# Patient Record
Sex: MALE | Race: WHITE | Employment: UNEMPLOYED | URBAN - METROPOLITAN AREA
[De-identification: names, ages, dates, MRNs, and addresses within clinical notes are randomized per-mention and may not be internally consistent; named-entity substitution may affect disease eponyms.]

---

## 2017-02-06 ENCOUNTER — DOCUMENTATION ONLY (OUTPATIENT)
Dept: NEUROLOGY | Age: 22
End: 2017-02-06

## 2017-02-06 ENCOUNTER — TELEPHONE (OUTPATIENT)
Dept: NEUROLOGY | Age: 22
End: 2017-02-06

## 2017-02-06 DIAGNOSIS — R56.9 CONVULSIONS, UNSPECIFIED CONVULSION TYPE (HCC): ICD-10-CM

## 2017-02-06 RX ORDER — LAMOTRIGINE 300 MG/1
300 TABLET, EXTENDED RELEASE ORAL
Qty: 30 TAB | Refills: 0 | Status: SHIPPED | OUTPATIENT
Start: 2017-02-06

## 2017-02-06 NOTE — TELEPHONE ENCOUNTER
Patients dad called asking for refill  Generic lamotrigine xr   Please review script and sign if approved

## 2017-02-06 NOTE — TELEPHONE ENCOUNTER
Called and spoke to dad of patient   Patient is asking for a refill   Made follow up appt 2/24/17 at [facility]  Sent request to provider awaiting approval

## 2017-02-06 NOTE — TELEPHONE ENCOUNTER
Patient is trying to get a prescription for lamictal to be sent to [facility] [address]   He was switched to Immediate release but hes having a reaction

## 2017-02-06 NOTE — TELEPHONE ENCOUNTER
Let father know patient is being discharged from Neurology clinic due frequent office visit cancellations (8-8-16, 8-17-16, 1-16-17, 1-17-17), no-shows (8-18-16, 8-23-16), medication non-compliance (0 lamictal in system when checked in October 2016, and not completing EEG request (given eeg order at last visit in July 2016).     Will send him a one-month Rx for Lamictal

## 2025-03-10 ENCOUNTER — TELEPHONE (OUTPATIENT)
Age: 30
End: 2025-03-10

## 2025-03-31 ENCOUNTER — OFFICE VISIT (OUTPATIENT)
Age: 30
End: 2025-03-31
Payer: MEDICAID

## 2025-03-31 VITALS
WEIGHT: 135 LBS | HEIGHT: 69 IN | DIASTOLIC BLOOD PRESSURE: 54 MMHG | RESPIRATION RATE: 16 BRPM | OXYGEN SATURATION: 96 % | SYSTOLIC BLOOD PRESSURE: 110 MMHG | BODY MASS INDEX: 19.99 KG/M2 | HEART RATE: 78 BPM

## 2025-03-31 DIAGNOSIS — R56.9 CONVULSIONS, UNSPECIFIED CONVULSION TYPE (HCC): Primary | ICD-10-CM

## 2025-03-31 PROCEDURE — 99213 OFFICE O/P EST LOW 20 MIN: CPT | Performed by: PSYCHIATRY & NEUROLOGY

## 2025-03-31 NOTE — PROGRESS NOTES
[facility] at [facility] at [address] [place] [address] [place] [address] [address] [facility] [address] [place] [address] [phone]              Chief Complaint   Patient presents with    Seizures     LOV 4/10/2023 // Pt states he is here for an updated DMV form - denies any sz activity - states he is here to have a DMV form completed - states he is on no prescription meds      No current outpatient medications on file.     No current facility-administered medications for this visit.      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  Today he reports he has continued without events.  He is here because DMV forms need to be completed.      Diagnostics  EEG ambulatory was unremarkable EEG 2014 24 ambulatory unremarkable  EEG from [facility] dated 11/11/2015 normal awake and asleep  MRI of the brain May 2014 unremarkable  Dry head CT April 28, 2014 unremarkable    Record review  Patient was previously seen by Dr. Llanes last in 2016  From his note of  20th 2016 he was following up for seizure disorder and it was a question of epileptic versus nonepileptic/stress related at that time he reported his last seizure was around Christmas or New Year's and he went to [facility] for that.    Examination  BP (!) 110/54 (BP Site: Left Upper Arm, Patient Position: Sitting)

## 2025-05-01 ENCOUNTER — APPOINTMENT (OUTPATIENT)
Facility: HOSPITAL | Age: 30
End: 2025-05-01
Payer: MEDICAID

## 2025-05-01 ENCOUNTER — HOSPITAL ENCOUNTER (EMERGENCY)
Facility: HOSPITAL | Age: 30
Discharge: HOME | End: 2025-05-01
Attending: EMERGENCY MEDICINE
Payer: MEDICAID

## 2025-05-01 VITALS
DIASTOLIC BLOOD PRESSURE: 94 MMHG | HEART RATE: 52 BPM | BODY MASS INDEX: 20.61 KG/M2 | TEMPERATURE: 97.4 F | RESPIRATION RATE: 16 BRPM | WEIGHT: 139.55 LBS | OXYGEN SATURATION: 99 % | SYSTOLIC BLOOD PRESSURE: 148 MMHG

## 2025-05-01 DIAGNOSIS — S63.651A SPRAIN OF METACARPOPHALANGEAL (MCP) JOINT OF LEFT INDEX FINGER, INITIAL ENCOUNTER: Primary | ICD-10-CM

## 2025-05-01 PROCEDURE — 73140 X-RAY EXAM OF FINGER(S): CPT

## 2025-05-01 PROCEDURE — 99283 EMERGENCY DEPT VISIT LOW MDM: CPT

## 2025-05-01 RX ORDER — IBUPROFEN 600 MG/1
600 TABLET, FILM COATED ORAL
Status: DISCONTINUED | OUTPATIENT
Start: 2025-05-01 | End: 2025-05-01 | Stop reason: HOSPADM

## 2025-05-01 RX ORDER — ACETAMINOPHEN 500 MG
1000 TABLET ORAL 3 TIMES DAILY
Qty: 120 TABLET | Refills: 3 | Status: SHIPPED | OUTPATIENT
Start: 2025-05-01

## 2025-05-01 RX ORDER — IBUPROFEN 600 MG/1
600 TABLET, FILM COATED ORAL EVERY 6 HOURS PRN
Qty: 28 TABLET | Refills: 0 | Status: SHIPPED | OUTPATIENT
Start: 2025-05-01 | End: 2025-05-08

## 2025-05-01 ASSESSMENT — PAIN - FUNCTIONAL ASSESSMENT
PAIN_FUNCTIONAL_ASSESSMENT: NONE - DENIES PAIN
PAIN_FUNCTIONAL_ASSESSMENT: 0-10

## 2025-05-01 ASSESSMENT — PAIN SCALES - GENERAL: PAINLEVEL_OUTOF10: 4

## 2025-05-01 NOTE — ED TRIAGE NOTES
Pt complaining of left hand pain.  States he has pain in his first finger in the knuckle region.  Swelling noted.  Pain x1 month

## 2025-05-01 NOTE — ED PROVIDER NOTES
[facility]  EMERGENCY DEPARTMENT ENCOUNTER      Pt Name: Christiano Meyer  MRN: [id number]  Birthdate 1995  Date of evaluation: 5/1/2025  Provider: Heriberto Fofana MD    CHIEF COMPLAINT       Chief Complaint   Patient presents with    Hand Pain       EMERGENCY DEPARTMENT COURSE and DIFFERENTIAL DIAGNOSIS/MDM:   Medical Decision Making  29-year-old male presenting ER with reports of left hand/finger pain.  Patient reports having pain for the last 4 weeks.  Denies any specific injuries but thinks that the finger was pulled into hyper flexed position.  After this injury patient reports having swelling and difficulty moving the finger.  Patient reports that due to his job and having kids has not been able to have time to get this evaluated.  Hoping it would just get better.  However symptoms continued.  Swelling has improved but still has pain with certain movements particularly flexion of the finger.  Denies any numbness or weakness.  No pain of the hand or wrist.  No previous injuries to this hand or finger.  Has not been take any medications for pain.  On examination patient has some mild swelling of the left index finger MCP joints.  Worsened pain with flexion of the finger however flexion extension intact.  No notable deformities normal cap refill.  X-ray obtained with no evidence of any fractures or dislocations.  Based on description concern for finger sprain.  Placed in finger splint and given for information for orthopedics.  Discussed RICE therapy.  Patient stable for discharge    Amount and/or Complexity of Data Reviewed  Radiology: ordered and independent interpretation performed. Decision-making details documented in ED Course.     Details: Personal interpretation no fractures or dislocations.    Risk  OTC drugs.  Prescription drug management.            REASSESSMENT          HISTORY OF PRESENT ILLNESS      29-year-old male presenting ER with reports of left hand/finger  deformity. Normal range of motion. Normal sensation. There is no disruption of two-point discrimination. Normal capillary refill. Normal pulse.        Hands:       Cervical back: Neck supple.   Skin:     General: Skin is warm.      Capillary Refill: Capillary refill takes less than 2 seconds.   Neurological:      General: No focal deficit present.      Mental Status: He is alert and oriented to person, place, and time.         DIAGNOSTIC RESULTS     RADIOLOGY:   Interpretation per the Radiologist below, if available at the time of this note:    XR FINGER LEFT (MIN 2 VIEWS)   Final Result   No acute abnormality.         Electronically signed by Glynn Hills           LABS:    No results found for this visit on 05/01/25.       CONSULTS:  None    PROCEDURES:  Unless otherwise noted below, none     Splint Application    Date/Time: 5/1/2025 2:27 AM    Performed by: Heriberto Fofana MD  Authorized by: Heriberto Fofana MD    Consent:     Consent obtained:  Verbal    Consent given by:  Patient    Risks, benefits, and alternatives were discussed: yes      Risks discussed:  Discoloration, numbness, pain and swelling    Alternatives discussed:  Alternative treatment  Universal protocol:     Procedure explained and questions answered to patient or proxy's satisfaction: yes      Imaging studies available: yes      Site/side marked: yes      Patient identity confirmed:  Verbally with patient and arm band  Pre-procedure details:     Distal neurologic exam:  Normal    Distal perfusion: distal pulses strong    Procedure details:     Location:  Finger    Finger location:  L index finger    Splint type:  Finger    Supplies:  Aluminum splint and elastic bandage    Attestation: Splint applied and adjusted personally by me    Post-procedure details:     Distal neurologic exam:  Normal    Distal perfusion: distal pulses strong      Procedure completion:  Tolerated well, no immediate complications    Post-procedure imaging: not